# Patient Record
Sex: FEMALE | Race: WHITE | NOT HISPANIC OR LATINO | URBAN - METROPOLITAN AREA
[De-identification: names, ages, dates, MRNs, and addresses within clinical notes are randomized per-mention and may not be internally consistent; named-entity substitution may affect disease eponyms.]

---

## 2022-05-02 ENCOUNTER — LASER (OUTPATIENT)
Dept: URBAN - METROPOLITAN AREA CLINIC 38 | Facility: CLINIC | Age: 37
Setting detail: DERMATOLOGY
End: 2022-05-02

## 2024-09-17 ENCOUNTER — OFFICE VISIT (OUTPATIENT)
Dept: URGENT CARE | Facility: CLINIC | Age: 39
End: 2024-09-17
Payer: COMMERCIAL

## 2024-09-17 VITALS
HEART RATE: 84 BPM | OXYGEN SATURATION: 99 % | BODY MASS INDEX: 18.48 KG/M2 | SYSTOLIC BLOOD PRESSURE: 111 MMHG | RESPIRATION RATE: 18 BRPM | HEIGHT: 66 IN | WEIGHT: 115 LBS | TEMPERATURE: 98.1 F | DIASTOLIC BLOOD PRESSURE: 77 MMHG

## 2024-09-17 DIAGNOSIS — R53.83 OTHER FATIGUE: ICD-10-CM

## 2024-09-17 DIAGNOSIS — R11.0 NAUSEA: ICD-10-CM

## 2024-09-17 DIAGNOSIS — H65.01 NON-RECURRENT ACUTE SEROUS OTITIS MEDIA OF RIGHT EAR: Primary | ICD-10-CM

## 2024-09-17 LAB — POC RAPID MONO: NEGATIVE

## 2024-09-17 PROCEDURE — 86308 HETEROPHILE ANTIBODY SCREEN: CPT | Performed by: NURSE PRACTITIONER

## 2024-09-17 PROCEDURE — 99213 OFFICE O/P EST LOW 20 MIN: CPT | Performed by: NURSE PRACTITIONER

## 2024-09-17 RX ORDER — FLUTICASONE PROPIONATE 50 MCG
1 SPRAY, SUSPENSION (ML) NASAL 2 TIMES DAILY
Qty: 16 G | Refills: 0 | Status: SHIPPED | OUTPATIENT
Start: 2024-09-17 | End: 2025-09-17

## 2024-09-17 RX ORDER — ONDANSETRON 4 MG/1
4 TABLET, ORALLY DISINTEGRATING ORAL EVERY 8 HOURS PRN
Qty: 20 TABLET | Refills: 0 | Status: SHIPPED | OUTPATIENT
Start: 2024-09-17 | End: 2024-09-24

## 2024-09-17 RX ORDER — AMOXICILLIN 875 MG/1
875 TABLET, FILM COATED ORAL 2 TIMES DAILY
Qty: 20 TABLET | Refills: 0 | Status: SHIPPED | OUTPATIENT
Start: 2024-09-17 | End: 2024-09-27

## 2024-09-17 NOTE — PROGRESS NOTES
39 y.o. female presents for evaluation of fever the resolved after one day of symptoms and began 10 days ago with headache, cough, fatigue, nasal congestion which are still lingering. She states she develop increased fatigue, dizziness, sore throat, right ear pain, nausea over the past day. Denies current fever, v/d, body aches, chest pains, SOB, or any other associated symptoms. No otc meds for symptoms. No known ill contacts. Denies chance for pregnancy as she just finished her menses. No other complaints.      Vitals:    09/17/24 1743   BP: 111/77   Pulse: 84   Resp: 18   Temp: 36.7 °C (98.1 °F)   SpO2: 99%       No Known Allergies    Medication Documentation Review Audit       Reviewed by Jessica Dalton MA (Medical Assistant) on 09/17/24 at 1741      Medication Order Taking? Sig Documenting Provider Last Dose Status            No Medications to Display                                   Past Medical History:   Diagnosis Date    Encounter for gynecological examination (general) (routine) without abnormal findings     Pap test, as part of routine gynecological examination    Other conditions influencing health status     History of vaginal delivery    Other conditions influencing health status     Menarche       Past Surgical History:   Procedure Laterality Date    OTHER SURGICAL HISTORY  10/26/2019    [place] tooth extraction       ROS  See HPI    Physical Exam  Vitals and nursing note reviewed.   Constitutional:       General: She is not in acute distress.     Appearance: Normal appearance. She is normal weight. She is not toxic-appearing.   HENT:      Head: Normocephalic and atraumatic.      Right Ear: Tympanic membrane, ear canal and external ear normal. No middle ear effusion. Tympanic membrane is not erythematous or bulging.      Left Ear: Ear canal and external ear normal. A middle ear effusion (clear) is present. Tympanic membrane is erythematous (mild). Tympanic membrane is not bulging.      Nose:  Congestion present.      Mouth/Throat:      Mouth: Mucous membranes are moist.      Pharynx: Posterior oropharyngeal erythema (mild) present.   Eyes:      Extraocular Movements: Extraocular movements intact.      Conjunctiva/sclera: Conjunctivae normal.      Pupils: Pupils are equal, round, and reactive to light.   Cardiovascular:      Rate and Rhythm: Normal rate.   Pulmonary:      Effort: Pulmonary effort is normal.      Breath sounds: Normal breath sounds.   Lymphadenopathy:      Cervical: Cervical adenopathy present.   Skin:     General: Skin is warm and dry.   Neurological:      General: No focal deficit present.      Mental Status: She is alert and oriented to person, place, and time.   Psychiatric:         Mood and Affect: Mood normal.         Behavior: Behavior normal.       Recent Results (from the past 1 hour(s))   POCT Infectious mononucleosis antibody manually resulted    Collection Time: 09/17/24  6:03 PM   Result Value Ref Range    POC Rapid Mono Negative Negative     Assessment/Plan/MDM  Dulce was seen today for uri.  Diagnoses and all orders for this visit:  Non-recurrent acute serous otitis media of right ear (Primary)  -     amoxicillin (Amoxil) 875 mg tablet; Take 1 tablet (875 mg) by mouth 2 times a day for 10 days.  -     fluticasone (Flonase) 50 mcg/actuation nasal spray; Administer 1 spray into each nostril 2 times a day. Shake gently. Before first use, prime pump. After use, clean tip and replace cap.  Other fatigue  -     POCT Infectious mononucleosis antibody manually resulted  Nausea  -     ondansetron ODT (Zofran-ODT) 4 mg disintegrating tablet; Take 1 tablet (4 mg) by mouth every 8 hours if needed for nausea or vomiting for up to 7 days.    Encouraged pt to continue otc cold remedies PRN, push PO fluids and rest. Patient's clinical presentation is otherwise unremarkable at this time. Patient is discharged with instructions to follow-up with primary care or seek emergency medical attention  for worsening symptoms or any new concerns.    I did personally review Dulce's past medical history, surgical history, social history, as well as family history (when relevant).  In this case, I also oversaw the her drug management by reviewing her medication list, allergy list, as well as the medications that I prescribed during the UC course and/or recommended as an out-patient (including possible OTC medications such as acetaminophen, NSAIDs , etc).    After reviewing the items above, I did look at previous medical documentation, such as recent hospitalizations, office visits, and/or recent consultations with PCP/specialist.                          SDOH:   Another factor that I considered in Dulce's care was her Social Determinants of Health (SDOH). During this UC encounter, she did not have social determinants of health. Those SDOH influencing Dulce's care are: none      Eulalio Acuna CNP  [facility]  [phone]

## 2025-02-19 ENCOUNTER — OFFICE VISIT (OUTPATIENT)
Dept: URGENT CARE | Facility: CLINIC | Age: 40
End: 2025-02-19
Payer: COMMERCIAL

## 2025-02-19 VITALS
RESPIRATION RATE: 16 BRPM | HEIGHT: 66 IN | DIASTOLIC BLOOD PRESSURE: 77 MMHG | BODY MASS INDEX: 18.49 KG/M2 | HEART RATE: 99 BPM | OXYGEN SATURATION: 97 % | WEIGHT: 115.08 LBS | SYSTOLIC BLOOD PRESSURE: 124 MMHG | TEMPERATURE: 98.3 F

## 2025-02-19 DIAGNOSIS — J45.20 MILD INTERMITTENT REACTIVE AIRWAY DISEASE WITHOUT COMPLICATION (HHS-HCC): Primary | ICD-10-CM

## 2025-02-19 PROCEDURE — 99213 OFFICE O/P EST LOW 20 MIN: CPT | Performed by: NURSE PRACTITIONER

## 2025-02-19 RX ORDER — BROMPHENIRAMINE MALEATE, PSEUDOEPHEDRINE HYDROCHLORIDE, AND DEXTROMETHORPHAN HYDROBROMIDE 2; 30; 10 MG/5ML; MG/5ML; MG/5ML
5 SYRUP ORAL 4 TIMES DAILY PRN
Qty: 120 ML | Refills: 0 | Status: SHIPPED | OUTPATIENT
Start: 2025-02-19 | End: 2025-03-01

## 2025-02-19 RX ORDER — ALBUTEROL SULFATE 90 UG/1
2 INHALANT RESPIRATORY (INHALATION) EVERY 4 HOURS PRN
Qty: 18 G | Refills: 0 | Status: SHIPPED | OUTPATIENT
Start: 2025-02-19 | End: 2025-03-21

## 2025-02-19 RX ORDER — PREDNISONE 10 MG/1
30 TABLET ORAL DAILY
Qty: 21 TABLET | Refills: 0 | Status: SHIPPED | OUTPATIENT
Start: 2025-02-19 | End: 2025-02-26

## 2025-02-19 NOTE — PROGRESS NOTES
[facility]   NAN NOTE:      Name: Dulce Lloyd, 39 y.o.    CSN:[id number]   MRN:[id number]    PCP: No Assigned PCP Generic Provider, MD    ALL:  No Known Allergies    History:    Chief Complaint: Flu Symptoms (Cough, congestion and shortness of breath for 1.5weeks)    Encounter Date: 2/19/2025      HPI: The history was obtained from the patient. Dulce is a 39 y.o. female, who presents with a chief complaint of Flu Symptoms (Cough, congestion and shortness of breath for 1.5weeks) complaints of flulike symptoms that began last week on Tuesday, Wednesday, or Thursday.  Children home ill with influenza A 1 week prior to onset of illness.  Was seen by her ear nose and throat provider started her on Augmentin, reports 6 doses taken without any improvement of symptoms.  Productive cough with thick green mucus, shortness of breath at rest and with activity, denies audible wheezing.    PMHx:    Past Medical History:   Diagnosis Date    Encounter for gynecological examination (general) (routine) without abnormal findings     Pap test, as part of routine gynecological examination    Other conditions influencing health status     History of vaginal delivery    Other conditions influencing health status     Menarche              Current Outpatient Medications   Medication Sig Dispense Refill    albuterol 90 mcg/actuation inhaler Inhale 2 puffs every 4 hours if needed for wheezing or shortness of breath. 18 g 0    brompheniramine-pseudoeph-DM 2-30-10 mg/5 mL syrup Take 5 mL by mouth 4 times a day as needed for congestion or cough for up to 10 days. 120 mL 0    fluticasone (Flonase) 50 mcg/actuation nasal spray Administer 1 spray into each nostril 2 times a day. Shake gently. Before first use, prime pump. After use, clean tip and replace cap. 16 g 0    predniSONE (Deltasone) 10 mg tablet Take 3 tablets (30 mg) by mouth once daily for 7 days. 21 tablet 0     No current facility-administered  medications for this visit.         PMSx:    Past Surgical History:   Procedure Laterality Date    OTHER SURGICAL HISTORY  10/26/2019    [place] tooth extraction       Fam Hx: No family history on file.    SOC. Hx:     Social History     Socioeconomic History    Marital status:      Spouse name: Not on file    Number of children: Not on file    Years of education: Not on file    Highest education level: Not on file   Occupational History    Not on file   Tobacco Use    Smoking status: Never    Smokeless tobacco: Never   Substance and Sexual Activity    Alcohol use: Not on file    Drug use: Not on file    Sexual activity: Not on file   Other Topics Concern    Not on file   Social History Narrative    Not on file     Social Drivers of Health     Financial Resource Strain: Not on file   Food Insecurity: Not on file   Transportation Needs: Not on file   Physical Activity: Not on file   Stress: Not on file   Social Connections: Not on file   Intimate Partner Violence: Not on file   Housing Stability: Not on file         Vitals:    02/19/25 1811   BP: 124/77   Pulse: 99   Resp: 16   Temp: 36.8 °C (98.3 °F)   SpO2: 97%     52.2 kg (115 lb 1.3 oz)          Physical Exam  Vitals and nursing note reviewed.   Constitutional:       Appearance: Normal appearance.   HENT:      Head: Normocephalic and atraumatic.      Right Ear: Hearing, ear canal and external ear normal. A middle ear effusion is present. Tympanic membrane is erythematous. Tympanic membrane is not bulging. Tympanic membrane has normal mobility.      Left Ear: Hearing, ear canal and external ear normal. A middle ear effusion is present. Tympanic membrane is erythematous. Tympanic membrane is not bulging. Tympanic membrane has normal mobility.      Nose: Congestion and rhinorrhea present. Rhinorrhea is purulent.      Right Sinus: Maxillary sinus tenderness and frontal sinus tenderness present.      Left Sinus: Maxillary sinus tenderness and frontal sinus  tenderness present.      Mouth/Throat:      Lips: Pink.      Mouth: Mucous membranes are moist.      Pharynx: Uvula midline. Posterior oropharyngeal erythema present. No pharyngeal swelling or oropharyngeal exudate.      Tonsils: No tonsillar exudate.   Cardiovascular:      Rate and Rhythm: Normal rate and regular rhythm.      Heart sounds: Normal heart sounds.   Pulmonary:      Effort: Pulmonary effort is normal. No respiratory distress.      Breath sounds: Normal breath sounds. No stridor. No wheezing, rhonchi or rales.   Chest:      Chest wall: No tenderness.   Abdominal:      General: Bowel sounds are normal.   Skin:     General: Skin is warm and dry.   Neurological:      Mental Status: She is alert and oriented to person, place, and time.         ____________________________________________________________________    I did personally review Dulce's past medical history, surgical history, social history, as well as family history (when relevant).  In this case, I also oversaw the her drug management by reviewing her medication list, allergy list, as well as the medications that I prescribed during the UC course and/or recommended as an out-patient (including possible OTC medications such as acetaminophen, NSAIDs , etc).    After reviewing the items above, I did look at previous medical documentation, such as recent hospitalizations, office visits, and/or recent consultations with PCP/specialist.                          SDOH:   Another factor that I considered in Dulce's care was her Social Determinants of Health (SDOH). During this UC encounter, she did not have social determinants of health. Those SDOH influencing Dulce's care are: none      _____________________________________________________________________      UC COURSE/MEDICAL DECISION MAKING:    Dulce is a 39 y.o., who presents with a working diagnosis of   1. Mild intermittent reactive airway disease without complication ([facility])        Dulce was seen today for  flu symptoms.  Diagnoses and all orders for this visit:  Mild intermittent reactive airway disease without complication ([facility]) (Primary)  -     predniSONE (Deltasone) 10 mg tablet; Take 3 tablets (30 mg) by mouth once daily for 7 days.  -     albuterol 90 mcg/actuation inhaler; Inhale 2 puffs every 4 hours if needed for wheezing or shortness of breath.  -     brompheniramine-pseudoeph-DM 2-30-10 mg/5 mL syrup; Take 5 mL by mouth 4 times a day as needed for congestion or cough for up to 10 days.         Plan of care reviewed with patient.  If symptoms change and/or worsen will follow-up with primary care provider, return to urgent care, or go to the nearest emergency department for further evaluation.  Patient states understanding and is agreeable with plan of care.      KANDY Esquivel, DNP   Advanced Practice Provider  [facility]    Please note: While the patient may or may not have received printed discharge paperwork, all relevant medical findings, test results, and treatment details are accessible through the electronic medical record system. The patient is encouraged to review their chart via the patient portal for comprehensive information and follow-up instructions.